# Patient Record
Sex: FEMALE | Race: WHITE | NOT HISPANIC OR LATINO | Employment: UNEMPLOYED | URBAN - METROPOLITAN AREA
[De-identification: names, ages, dates, MRNs, and addresses within clinical notes are randomized per-mention and may not be internally consistent; named-entity substitution may affect disease eponyms.]

---

## 2023-04-20 ENCOUNTER — HOSPITAL ENCOUNTER (EMERGENCY)
Facility: HOSPITAL | Age: 38
Discharge: HOME | End: 2023-04-20
Attending: EMERGENCY MEDICINE

## 2023-04-20 VITALS
SYSTOLIC BLOOD PRESSURE: 123 MMHG | HEART RATE: 102 BPM | OXYGEN SATURATION: 100 % | WEIGHT: 120 LBS | DIASTOLIC BLOOD PRESSURE: 67 MMHG | HEIGHT: 64 IN | RESPIRATION RATE: 16 BRPM | BODY MASS INDEX: 20.49 KG/M2 | TEMPERATURE: 97 F

## 2023-04-20 DIAGNOSIS — T62.0X4A: ICD-10-CM

## 2023-04-20 DIAGNOSIS — F19.10 POLYSUBSTANCE ABUSE: ICD-10-CM

## 2023-04-20 DIAGNOSIS — F30.10 MANIC BEHAVIOR: ICD-10-CM

## 2023-04-20 DIAGNOSIS — Z00.8 MEDICAL CLEARANCE FOR PSYCHIATRIC ADMISSION: Primary | ICD-10-CM

## 2023-04-20 DIAGNOSIS — T65.91XA INGESTION OF SUBSTANCE, ACCIDENTAL OR UNINTENTIONAL, INITIAL ENCOUNTER: ICD-10-CM

## 2023-04-20 LAB
ALBUMIN SERPL BCP-MCNC: 4.5 G/DL (ref 3.5–5.2)
ALP SERPL-CCNC: 150 U/L (ref 55–135)
ALT SERPL W/O P-5'-P-CCNC: 23 U/L (ref 10–44)
AMPHET+METHAMPHET UR QL: ABNORMAL
ANION GAP SERPL CALC-SCNC: 14 MMOL/L (ref 8–16)
APAP SERPL-MCNC: <3 UG/ML (ref 10–20)
AST SERPL-CCNC: 28 U/L (ref 10–40)
B-HCG UR QL: NEGATIVE
BARBITURATES UR QL SCN>200 NG/ML: NEGATIVE
BASOPHILS # BLD AUTO: 0.08 K/UL (ref 0–0.2)
BASOPHILS NFR BLD: 0.8 % (ref 0–1.9)
BENZODIAZ UR QL SCN>200 NG/ML: NEGATIVE
BILIRUB SERPL-MCNC: 0.3 MG/DL (ref 0.1–1)
BILIRUB UR QL STRIP: NEGATIVE
BUN SERPL-MCNC: 7 MG/DL (ref 6–20)
BZE UR QL SCN: NEGATIVE
CALCIUM SERPL-MCNC: 9.8 MG/DL (ref 8.7–10.5)
CANNABINOIDS UR QL SCN: ABNORMAL
CHLORIDE SERPL-SCNC: 101 MMOL/L (ref 95–110)
CK SERPL-CCNC: 336 U/L (ref 20–180)
CLARITY UR: ABNORMAL
CO2 SERPL-SCNC: 23 MMOL/L (ref 23–29)
COLOR UR: YELLOW
CREAT SERPL-MCNC: 0.9 MG/DL (ref 0.5–1.4)
CREAT UR-MCNC: 146.7 MG/DL (ref 15–325)
DIFFERENTIAL METHOD: ABNORMAL
EOSINOPHIL # BLD AUTO: 0.1 K/UL (ref 0–0.5)
EOSINOPHIL NFR BLD: 1 % (ref 0–8)
ERYTHROCYTE [DISTWIDTH] IN BLOOD BY AUTOMATED COUNT: 13.5 % (ref 11.5–14.5)
EST. GFR  (NO RACE VARIABLE): >60 ML/MIN/1.73 M^2
ETHANOL SERPL-MCNC: <10 MG/DL
GLUCOSE SERPL-MCNC: 108 MG/DL (ref 70–110)
GLUCOSE UR QL STRIP: NEGATIVE
HCG INTACT+B SERPL-ACNC: 5.5 MIU/ML
HCG SERPL QL: POSITIVE
HCT VFR BLD AUTO: 37.1 % (ref 37–48.5)
HGB BLD-MCNC: 11.8 G/DL (ref 12–16)
HGB UR QL STRIP: NEGATIVE
IMM GRANULOCYTES # BLD AUTO: 0.01 K/UL (ref 0–0.04)
IMM GRANULOCYTES NFR BLD AUTO: 0.1 % (ref 0–0.5)
KETONES UR QL STRIP: NEGATIVE
LEUKOCYTE ESTERASE UR QL STRIP: NEGATIVE
LYMPHOCYTES # BLD AUTO: 3.2 K/UL (ref 1–4.8)
LYMPHOCYTES NFR BLD: 30.2 % (ref 18–48)
MCH RBC QN AUTO: 25.8 PG (ref 27–31)
MCHC RBC AUTO-ENTMCNC: 31.8 G/DL (ref 32–36)
MCV RBC AUTO: 81 FL (ref 82–98)
METHADONE UR QL SCN>300 NG/ML: NEGATIVE
MONOCYTES # BLD AUTO: 0.6 K/UL (ref 0.3–1)
MONOCYTES NFR BLD: 5.3 % (ref 4–15)
NEUTROPHILS # BLD AUTO: 6.7 K/UL (ref 1.8–7.7)
NEUTROPHILS NFR BLD: 62.6 % (ref 38–73)
NITRITE UR QL STRIP: NEGATIVE
NRBC BLD-RTO: 0 /100 WBC
OPIATES UR QL SCN: ABNORMAL
PCP UR QL SCN>25 NG/ML: NEGATIVE
PH UR STRIP: 6 [PH] (ref 5–8)
PLATELET # BLD AUTO: 502 K/UL (ref 150–450)
PMV BLD AUTO: 9.9 FL (ref 9.2–12.9)
POTASSIUM SERPL-SCNC: 3.4 MMOL/L (ref 3.5–5.1)
PROT SERPL-MCNC: 9.3 G/DL (ref 6–8.4)
PROT UR QL STRIP: NEGATIVE
RBC # BLD AUTO: 4.58 M/UL (ref 4–5.4)
SODIUM SERPL-SCNC: 138 MMOL/L (ref 136–145)
SP GR UR STRIP: 1.01 (ref 1–1.03)
T4 FREE SERPL-MCNC: 0.98 NG/DL (ref 0.71–1.51)
TOXICOLOGY INFORMATION: ABNORMAL
TSH SERPL DL<=0.005 MIU/L-ACNC: 5.06 UIU/ML (ref 0.4–4)
URN SPEC COLLECT METH UR: ABNORMAL
UROBILINOGEN UR STRIP-ACNC: NEGATIVE EU/DL
WBC # BLD AUTO: 10.65 K/UL (ref 3.9–12.7)

## 2023-04-20 PROCEDURE — 63600175 PHARM REV CODE 636 W HCPCS: Performed by: EMERGENCY MEDICINE

## 2023-04-20 PROCEDURE — 85025 COMPLETE CBC W/AUTO DIFF WBC: CPT | Performed by: EMERGENCY MEDICINE

## 2023-04-20 PROCEDURE — 84443 ASSAY THYROID STIM HORMONE: CPT | Performed by: EMERGENCY MEDICINE

## 2023-04-20 PROCEDURE — 36415 COLL VENOUS BLD VENIPUNCTURE: CPT | Performed by: EMERGENCY MEDICINE

## 2023-04-20 PROCEDURE — 80143 DRUG ASSAY ACETAMINOPHEN: CPT | Performed by: EMERGENCY MEDICINE

## 2023-04-20 PROCEDURE — 82550 ASSAY OF CK (CPK): CPT | Performed by: EMERGENCY MEDICINE

## 2023-04-20 PROCEDURE — 80307 DRUG TEST PRSMV CHEM ANLYZR: CPT | Performed by: EMERGENCY MEDICINE

## 2023-04-20 PROCEDURE — 99285 EMERGENCY DEPT VISIT HI MDM: CPT | Mod: 25

## 2023-04-20 PROCEDURE — 84439 ASSAY OF FREE THYROXINE: CPT | Performed by: EMERGENCY MEDICINE

## 2023-04-20 PROCEDURE — 96372 THER/PROPH/DIAG INJ SC/IM: CPT | Performed by: EMERGENCY MEDICINE

## 2023-04-20 PROCEDURE — 81025 URINE PREGNANCY TEST: CPT | Performed by: EMERGENCY MEDICINE

## 2023-04-20 PROCEDURE — 81003 URINALYSIS AUTO W/O SCOPE: CPT | Mod: 59 | Performed by: EMERGENCY MEDICINE

## 2023-04-20 PROCEDURE — 84702 CHORIONIC GONADOTROPIN TEST: CPT | Performed by: EMERGENCY MEDICINE

## 2023-04-20 PROCEDURE — 82077 ASSAY SPEC XCP UR&BREATH IA: CPT | Performed by: EMERGENCY MEDICINE

## 2023-04-20 PROCEDURE — 84703 CHORIONIC GONADOTROPIN ASSAY: CPT | Performed by: EMERGENCY MEDICINE

## 2023-04-20 PROCEDURE — 80053 COMPREHEN METABOLIC PANEL: CPT | Performed by: EMERGENCY MEDICINE

## 2023-04-20 RX ORDER — LORAZEPAM 2 MG/ML
2 INJECTION INTRAMUSCULAR
Status: COMPLETED | OUTPATIENT
Start: 2023-04-20 | End: 2023-04-20

## 2023-04-20 RX ORDER — DIPHENHYDRAMINE HYDROCHLORIDE 50 MG/ML
50 INJECTION INTRAMUSCULAR; INTRAVENOUS
Status: COMPLETED | OUTPATIENT
Start: 2023-04-20 | End: 2023-04-20

## 2023-04-20 RX ADMIN — LORAZEPAM 2 MG: 2 INJECTION INTRAMUSCULAR; INTRAVENOUS at 03:04

## 2023-04-20 RX ADMIN — DIPHENHYDRAMINE HYDROCHLORIDE 50 MG: 50 INJECTION INTRAMUSCULAR; INTRAVENOUS at 03:04

## 2023-04-20 NOTE — ED NOTES
Pt brought in by [facility], found [place] dock [place]  road in [place]. Pt was hallucinating and anxious upon their arrival and the arrival to this ED. Pt was also in EMS restraints upon entrance to this ED. Pt could not give accurate self identification to EMS or ER staff for proper registration. Pt kept saying Lisa De La Cruz but would not give  and therefore had to be registered as Stephie Saba.  Pt placed into room 8 with ER sitter at bedside immediately and ER security made aware. Pt continuously screaming and shouting at ER staff obscenities and removing clothing to the point of nudity. Pt refused to put paper scrub pants on but did put scrub top on but refused to remove cyndee shorts. Pt belongins were as follows: 1 pair grey/white shoes, 1 metal ring, 2 metal earrings (non matching-one hoop/one stud), 1 metal chain, 1 cell phone, 1 sports bra, 1 white shirt. All belongings were bagged and locked in psych locker.

## 2023-04-20 NOTE — ED NOTES
ER Security is now pt sitter and charting on paper documentation. Please see paper charting for sitter documentation until otherwise noted in epic.

## 2023-04-20 NOTE — ED NOTES
Pt in room resting on stretcher with no signs or symptoms of distress noted. Pt is in direct visualization of sitter. There are no needs identified at this time. Pt eating chicken tenders. Will continue to monitor pt as needed.

## 2023-04-20 NOTE — ED NOTES
Pt in room resting on stretcher with no signs or symptoms of distress noted. Pt is in direct visualization of sitter. There are no needs identified at this time. Will continue to monitor pt as needed.

## 2023-04-20 NOTE — ED PROVIDER NOTES
"Encounter Date: 4/20/2023       History     Chief Complaint   Patient presents with    Ingestion     Pt comes in via ems with c/o ingestion of mushrooms and marijuana pt having panic attack and bizarre behavior      HPI 36-year-old woman presents to the ED via AMS for uncontrollable crying and bizarre behavior.  EMS was called for seizure activity and the patient's significant other but then called off.  Police found the patient crying uncontrollably and had EMS transport to the hospital.  Patient states she took mushrooms and THC.  [place] have [place] with her significant other until the "police showed up." She is restrained on the EMS stretcher.  Review of patient's allergies indicates:  Not on File  No past medical history on file.  No past surgical history on file.  No family history on file.     Review of Systems   Constitutional:  Negative for fever.   HENT:  Negative for sore throat.    Respiratory:  Negative for shortness of breath.    Cardiovascular:  Negative for chest pain.   Gastrointestinal:  Negative for nausea.   Genitourinary:  Negative for dysuria.   Musculoskeletal:  Negative for back pain.   Skin:  Negative for rash.   Neurological:  Negative for weakness.   Hematological:  Does not bruise/bleed easily.   Psychiatric/Behavioral:  Positive for agitation and behavioral problems.      Physical Exam     Initial Vitals [04/20/23 1448]   BP Pulse Resp Temp SpO2   (!) 153/109 (!) 125 (!) 30 99 °F (37.2 °C) 100 %      MAP       --         Physical Exam    Constitutional: Vital signs are normal. She appears well-developed and well-nourished.  Non-toxic appearance. Distressed: Crying and screaming.   HENT:   Head: Normocephalic and atraumatic.   Eyes: EOM are normal. Pupils are equal, round, and reactive to light.   Neck: Neck supple. No JVD present.   Normal range of motion.  Cardiovascular:  Regular rhythm, normal heart sounds and intact distal pulses.   Tachycardia " present.   Exam reveals no gallop and no friction rub.       No murmur heard.  Pulmonary/Chest: Breath sounds normal. She has no wheezes. She has no rhonchi. She has no rales.   Hyperventilating   Abdominal: Abdomen is soft. Bowel sounds are normal. There is no abdominal tenderness. There is no rebound and no guarding.   Musculoskeletal:         General: Normal range of motion.      Cervical back: Normal range of motion and neck supple. No rigidity.     Neurological: She is alert and oriented to person, place, and time. She has normal strength and normal reflexes. No cranial nerve deficit or sensory deficit. She exhibits normal muscle tone. Coordination normal. GCS eye subscore is 4. GCS verbal subscore is 5. GCS motor subscore is 6.   Skin: Skin is warm and dry.   Track marks on her arms   Psychiatric: Her mood appears anxious. Her speech is rapid and/or pressured. She is agitated. She is not actively hallucinating. Thought content is paranoid. She expresses impulsivity.   Patient is screaming uncontrollably, cursing at staff and threatening.  Stating she wanted to die.  She is difficult to redirect with multiple staff members outside of the room trying to direct her to get back in the room.  Be leveling staff in telling them you had no taste.  Telling other staff that they were Satan and asking got to deliver her.       ED Course   Procedures  Labs Reviewed   COMPREHENSIVE METABOLIC PANEL - Abnormal; Notable for the following components:       Result Value    Potassium 3.4 (*)     Total Protein 9.3 (*)     Alkaline Phosphatase 150 (*)     All other components within normal limits   CK - Abnormal; Notable for the following components:     (*)     All other components within normal limits   CBC W/ AUTO DIFFERENTIAL - Abnormal; Notable for the following components:    Hemoglobin 11.8 (*)     MCV 81 (*)     MCH 25.8 (*)     MCHC 31.8 (*)     Platelets 502 (*)     All other components within normal limits   TSH  - Abnormal; Notable for the following components:    TSH 5.059 (*)     All other components within normal limits   URINALYSIS, REFLEX TO URINE CULTURE - Abnormal; Notable for the following components:    Appearance, UA Hazy (*)     All other components within normal limits    Narrative:     Specimen Source->Urine   DRUG SCREEN PANEL, URINE EMERGENCY - Abnormal; Notable for the following components:    Opiate Scrn, Ur Presumptive Positive (*)     Amphetamine Screen, Ur Presumptive Positive (*)     THC Presumptive Positive (*)     All other components within normal limits    Narrative:     Specimen Source->Urine   ACETAMINOPHEN LEVEL - Abnormal; Notable for the following components:    Acetaminophen (Tylenol), Serum <3.0 (*)     All other components within normal limits   PREGNANCY TEST SCREENING, SERUM   ALCOHOL,MEDICAL (ETHANOL)   HCG, QUANTITATIVE    Narrative:     Release to patient->Immediate   T4, FREE   PREGNANCY TEST, URINE RAPID    Narrative:     Specimen Source->Urine          Imaging Results    None          Medications   LORazepam injection 2 mg (2 mg Intramuscular Given 4/20/23 1514)   diphenhydrAMINE injection 50 mg (50 mg Intramuscular Given 4/20/23 1515)     Medical Decision Making:   History:   Old Medical Records: I decided to obtain old medical records.  Initial Assessment:   This is an emergent evaluation for psychiatric evaluation.  The pt presents for evaluation of bizarre behavior, paranoia, manic speech and behavior.    I feel the pt is gravely disabled and pt was placed under PEC with direct observation.  Patient did express some passive suicidal sentences.  Medical workup was initiated to evaluate for organic etiologies.  Pt's etoh level was negative.  She is not pregnant.  I do not believe the pt has metabolic encephalopathy, CVA, severe electrolyte derrangement.    Patient is medically cleared for transfer to psychiatric facility..               ED Course as of 04/21/23 1148   Thu Apr 20,  2023 1920 Signed over from Dr. Spears, per sign-out he reports that she is medically clear for psychiatric placement at this time. [EF]   2205 Patient is now awake alert cooperative compliant.  She does acknowledge abusing mushrooms earlier today.  She has no current auditory or visual hallucinations.  She has no thoughts of self-harm.  All symptoms appear to be drug-induced.  She can be discharged home I will vacate the [facility]. [EF]      ED Course User Index  [EF] Coy Joy MD       Medically cleared for psychiatry placement: 4/20/2023  6:23 PM         Clinical Impression:   Final diagnoses:  [Z00.8] Medical clearance for psychiatric admission (Primary)  [F30.10] Manic behavior  [T65.91XA] Ingestion of substance, accidental or unintentional, initial encounter  [T62.0X4A] Toxic effect of ingested mushroom, undetermined intent, initial encounter  [F19.10] Polysubstance abuse        ED Disposition Condition    Discharge Stable          ED Prescriptions    None       Follow-up Information       Follow up With Specialties Details Why Contact Info    [facility], Psychiatry Schedule an appointment as soon as possible for a visit   [address] [place] [id number]  [phone]      [facility] Emergency Dept Emergency Medicine  As needed, If symptoms worsen [address] [place] [id number] [phone]             Fredo Spears MD  04/20/23 1587       Fredo Spears MD  04/21/23 5524